# Patient Record
Sex: MALE | Race: WHITE | NOT HISPANIC OR LATINO | URBAN - METROPOLITAN AREA
[De-identification: names, ages, dates, MRNs, and addresses within clinical notes are randomized per-mention and may not be internally consistent; named-entity substitution may affect disease eponyms.]

---

## 2022-12-13 PROBLEM — E66.2 CLASS 3 OBESITY WITH ALVEOLAR HYPOVENTILATION, SERIOUS COMORBIDITY, AND BODY MASS INDEX (BMI) OF 60.0 TO 69.9 IN ADULT: Status: ACTIVE | Noted: 2022-12-13

## 2022-12-13 PROBLEM — J96.01 ACUTE RESPIRATORY FAILURE WITH HYPOXIA AND HYPERCARBIA: Status: ACTIVE | Noted: 2022-12-13

## 2022-12-13 PROBLEM — R73.9 HYPERGLYCEMIA: Status: ACTIVE | Noted: 2022-12-13

## 2022-12-13 PROBLEM — I10 UNCONTROLLED HYPERTENSION: Status: ACTIVE | Noted: 2022-12-13

## 2022-12-13 PROBLEM — J96.02 ACUTE RESPIRATORY FAILURE WITH HYPOXIA AND HYPERCARBIA: Status: ACTIVE | Noted: 2022-12-13

## 2022-12-14 PROBLEM — A41.9 SEVERE SEPSIS: Status: ACTIVE | Noted: 2022-12-14

## 2022-12-14 PROBLEM — I50.21 ACUTE SYSTOLIC CONGESTIVE HEART FAILURE: Status: ACTIVE | Noted: 2022-12-14

## 2022-12-14 PROBLEM — R94.31 PROLONGED Q-T INTERVAL ON ECG: Status: ACTIVE | Noted: 2022-12-14

## 2022-12-14 PROBLEM — R65.20 SEVERE SEPSIS: Status: ACTIVE | Noted: 2022-12-14

## 2022-12-14 PROBLEM — Z72.0 TOBACCO ABUSE: Status: ACTIVE | Noted: 2022-12-14

## 2022-12-14 PROBLEM — I50.9 CHF EXACERBATION: Status: ACTIVE | Noted: 2022-12-14

## 2022-12-14 PROBLEM — I73.9 PVD (PERIPHERAL VASCULAR DISEASE): Status: ACTIVE | Noted: 2022-12-14

## 2022-12-14 PROBLEM — J44.1 COPD EXACERBATION: Status: ACTIVE | Noted: 2022-12-14

## 2022-12-14 PROBLEM — H11.33 SUBCONJUNCTIVAL HEMORRHAGE OF BOTH EYES: Status: ACTIVE | Noted: 2022-12-14

## 2022-12-14 PROBLEM — I87.8 VENOUS STASIS OF BOTH LOWER EXTREMITIES: Status: ACTIVE | Noted: 2022-12-14

## 2022-12-14 PROBLEM — R73.03 PREDIABETES: Status: ACTIVE | Noted: 2022-12-13

## 2022-12-14 PROBLEM — F15.10 METHAMPHETAMINE ABUSE: Status: ACTIVE | Noted: 2022-12-14

## 2022-12-14 PROBLEM — I50.23 ACUTE ON CHRONIC SYSTOLIC CONGESTIVE HEART FAILURE: Status: ACTIVE | Noted: 2022-12-14

## 2022-12-15 PROBLEM — I50.9 CHF EXACERBATION: Status: RESOLVED | Noted: 2022-12-14 | Resolved: 2022-12-15

## 2022-12-15 PROBLEM — R73.9 HYPERGLYCEMIA: Status: ACTIVE | Noted: 2022-12-15

## 2022-12-15 PROBLEM — R73.9 HYPERGLYCEMIA: Status: RESOLVED | Noted: 2022-12-15 | Resolved: 2022-12-15

## 2022-12-15 PROBLEM — Z91.148 NONCOMPLIANCE WITH MEDICATIONS: Status: ACTIVE | Noted: 2022-12-15

## 2023-01-05 ENCOUNTER — TELEPHONE (OUTPATIENT)
Dept: SMOKING CESSATION | Facility: CLINIC | Age: 33
End: 2023-01-05

## 2023-01-05 NOTE — TELEPHONE ENCOUNTER
Called to confirm clinic intake appt for 1/9/23. No answer. Left voice message with my contact information.

## 2023-01-09 ENCOUNTER — TELEPHONE (OUTPATIENT)
Dept: SMOKING CESSATION | Facility: CLINIC | Age: 33
End: 2023-01-09

## 2023-01-09 NOTE — TELEPHONE ENCOUNTER
Patient has not arrived for clinic intake appt. I called to confirm or reschedule. A gentleman answered and stated the patient was sleep. He asked for my contact information and stated he would have the patient return my call.

## 2023-01-17 ENCOUNTER — TELEPHONE (OUTPATIENT)
Dept: SMOKING CESSATION | Facility: CLINIC | Age: 33
End: 2023-01-17
Payer: COMMERCIAL

## 2023-01-17 NOTE — TELEPHONE ENCOUNTER
Patient has not arrived for clinic intake appt. Called to confirm or reschedule. No answer on mobile. Patient's father answer the other number and stated that he was not home and to call back in an hour.

## 2023-01-23 ENCOUNTER — TELEPHONE (OUTPATIENT)
Dept: SMOKING CESSATION | Facility: CLINIC | Age: 33
End: 2023-01-23
Payer: COMMERCIAL

## 2023-01-23 NOTE — TELEPHONE ENCOUNTER
2nf Attempt to reschedule canceled intake appt. No answer on first number. Left voice message. 2nd number no answer. Voicemail not set up.

## 2023-03-20 PROBLEM — R65.20 SEVERE SEPSIS: Status: RESOLVED | Noted: 2022-12-14 | Resolved: 2023-03-20

## 2023-03-20 PROBLEM — J96.02 ACUTE RESPIRATORY FAILURE WITH HYPOXIA AND HYPERCARBIA: Status: RESOLVED | Noted: 2022-12-13 | Resolved: 2023-03-20

## 2023-03-20 PROBLEM — A41.9 SEVERE SEPSIS: Status: RESOLVED | Noted: 2022-12-14 | Resolved: 2023-03-20

## 2023-03-20 PROBLEM — J96.01 ACUTE RESPIRATORY FAILURE WITH HYPOXIA AND HYPERCARBIA: Status: RESOLVED | Noted: 2022-12-13 | Resolved: 2023-03-20

## 2023-04-18 PROBLEM — L03.115 BILATERAL CELLULITIS OF LOWER LEG: Status: ACTIVE | Noted: 2023-04-18

## 2023-04-18 PROBLEM — L03.116 BILATERAL CELLULITIS OF LOWER LEG: Status: ACTIVE | Noted: 2023-04-18

## 2023-04-18 PROBLEM — M79.89 LEG SWELLING: Status: ACTIVE | Noted: 2023-04-18
